# Patient Record
Sex: MALE | Race: BLACK OR AFRICAN AMERICAN | NOT HISPANIC OR LATINO | Employment: FULL TIME | URBAN - METROPOLITAN AREA
[De-identification: names, ages, dates, MRNs, and addresses within clinical notes are randomized per-mention and may not be internally consistent; named-entity substitution may affect disease eponyms.]

---

## 2018-07-18 ENCOUNTER — TELEPHONE (OUTPATIENT)
Dept: SURGERY | Facility: CLINIC | Age: 58
End: 2018-07-18

## 2018-07-27 ENCOUNTER — TELEPHONE (OUTPATIENT)
Dept: SURGERY | Facility: CLINIC | Age: 58
End: 2018-07-27

## 2018-08-17 RX ORDER — ASPIRIN 81 MG/1
1 TABLET ORAL
COMMUNITY
Start: 2017-03-06

## 2018-08-17 RX ORDER — ATORVASTATIN CALCIUM 40 MG/1
1 TABLET, FILM COATED ORAL
COMMUNITY
Start: 2018-04-02 | End: 2018-08-17 | Stop reason: SDUPTHER

## 2018-08-17 RX ORDER — GABAPENTIN 300 MG/1
CAPSULE ORAL
COMMUNITY
Start: 2018-06-01

## 2018-08-17 RX ORDER — AMLODIPINE BESYLATE 5 MG/1
1 TABLET ORAL
COMMUNITY
Start: 2018-04-02

## 2018-08-17 RX ORDER — AMITRIPTYLINE HYDROCHLORIDE 25 MG/1
1 TABLET, FILM COATED ORAL
COMMUNITY
Start: 2017-12-20

## 2018-08-17 RX ORDER — METFORMIN HYDROCHLORIDE 500 MG/1
2 TABLET, EXTENDED RELEASE ORAL
COMMUNITY
Start: 2018-04-09

## 2018-08-23 ENCOUNTER — OFFICE VISIT (OUTPATIENT)
Dept: SURGERY | Facility: CLINIC | Age: 58
End: 2018-08-23
Payer: COMMERCIAL

## 2018-08-23 VITALS
DIASTOLIC BLOOD PRESSURE: 83 MMHG | TEMPERATURE: 97 F | WEIGHT: 183 LBS | OXYGEN SATURATION: 97 % | RESPIRATION RATE: 18 BRPM | BODY MASS INDEX: 29.41 KG/M2 | HEIGHT: 66 IN | SYSTOLIC BLOOD PRESSURE: 143 MMHG | HEART RATE: 87 BPM

## 2018-08-23 DIAGNOSIS — Z12.11 ENCOUNTER FOR SCREENING COLONOSCOPY: Primary | ICD-10-CM

## 2018-08-23 DIAGNOSIS — Z80.0 FAMILY HISTORY OF COLON CANCER: ICD-10-CM

## 2018-08-23 PROBLEM — Z79.4 TYPE 2 DIABETES MELLITUS WITH NEUROLOGIC COMPLICATION, WITH LONG-TERM CURRENT USE OF INSULIN: Status: ACTIVE | Noted: 2018-08-23

## 2018-08-23 PROBLEM — I10 ESSENTIAL HYPERTENSION: Status: ACTIVE | Noted: 2018-08-23

## 2018-08-23 PROBLEM — E78.00 HYPERCHOLESTEROLEMIA: Status: ACTIVE | Noted: 2018-08-23

## 2018-08-23 PROBLEM — E11.49 TYPE 2 DIABETES MELLITUS WITH NEUROLOGIC COMPLICATION, WITH LONG-TERM CURRENT USE OF INSULIN: Status: ACTIVE | Noted: 2018-08-23

## 2018-08-23 PROCEDURE — 3008F BODY MASS INDEX DOCD: CPT | Mod: S$GLB,,, | Performed by: SURGERY

## 2018-08-23 PROCEDURE — 99203 OFFICE O/P NEW LOW 30 MIN: CPT | Mod: S$GLB,,, | Performed by: SURGERY

## 2018-08-23 RX ORDER — LIDOCAINE HYDROCHLORIDE 10 MG/ML
1 INJECTION, SOLUTION EPIDURAL; INFILTRATION; INTRACAUDAL; PERINEURAL ONCE
Status: CANCELLED | OUTPATIENT
Start: 2018-08-23 | End: 2018-08-23

## 2018-08-23 RX ORDER — SODIUM, POTASSIUM,MAG SULFATES 17.5-3.13G
SOLUTION, RECONSTITUTED, ORAL ORAL
Qty: 2 BOTTLE | Refills: 0 | Status: ON HOLD | OUTPATIENT
Start: 2018-08-23 | End: 2018-09-07 | Stop reason: HOSPADM

## 2018-08-23 RX ORDER — SODIUM CHLORIDE, SODIUM LACTATE, POTASSIUM CHLORIDE, CALCIUM CHLORIDE 600; 310; 30; 20 MG/100ML; MG/100ML; MG/100ML; MG/100ML
INJECTION, SOLUTION INTRAVENOUS CONTINUOUS
Status: CANCELLED | OUTPATIENT
Start: 2018-08-23

## 2018-08-23 NOTE — H&P (VIEW-ONLY)
"[facility]  General Surgery  H&P      Patient Name: César Medina  MRN: [id number]     Chief Complaint: "I'm having a scope thing done"    HPI:   Mr. Medina is seen today in consultation from Adrianne MIRZA for colon cancer screening. He has no complaints. No abdominal pain, nausea, vomiting, diarrhea, constipation, melena, hematochezia, change in bowel habits, change in stool characteristics, weight loss, decrease in caliber of stool, etc. His younger brother was diagnosed with colon cancer a couple of years ago. No aggravating or alleviating factors. No other associated symptoms. No prior colonoscopy.     Allergies & Meds:   Review of patient's allergies indicates:   No Known Allergies   Current Medications          Current Outpatient Medications   Medication Sig Dispense Refill    amitriptyline (ELAVIL) 25 MG tablet Take 1 tablet by mouth.      amLODIPine (NORVASC) 5 MG tablet Take 1 tablet by mouth.      aspirin (ECOTRIN) 81 MG EC tablet Take 1 tablet by mouth.      atorvastatin (LIPITOR) 40 MG tablet Take 40 mg by mouth once daily.      gabapentin (NEURONTIN) 300 MG capsule take 1 capsule by oral route at bedtime - will cause drowsiness- use caution      insulin aspart protamine-insulin aspart (NOVOLOG 70/30) 100 unit/mL (70-30) InPn pen Inject into the skin 3 (three) times daily before meals.      insulin detemir (LEVEMIR) 100 unit/mL injection Inject 25 Units into the skin 2 (two) times daily before meals.      metFORMIN (GLUCOPHAGE XR) 500 MG 24 hr tablet Take 2 tablets by mouth.     No current facility-administered medications for this visit.       PMFSHx:        Past Medical History:   Diagnosis Date    Breast lump     Diabetes mellitus     Diabetes mellitus, type 2     Hypertension     Impotence of organic origin     Low testosterone level in male            Past Surgical History:   Procedure Laterality Date    FINGER SURGERY      Deep laceration of " finger           Family History   Problem Relation Age of Onset    Diabetes Mother     Hypertension Mother     Cancer Father     Diabetes Father     Diabetes Sister     Hyperlipidemia Sister     Cancer Brother     Diabetes Brother     Early death Brother     Heart disease Brother     Hyperlipidemia Brother     Diabetes Maternal Aunt     Diabetes Maternal Uncle     Diabetes Paternal Aunt     Diabetes Paternal Uncle     Diabetes Maternal Grandmother     Heart disease Maternal Grandfather     Early death Brother     Heart disease Brother      Social History           Tobacco Use    Smoking status: Never Smoker    Smokeless tobacco: Former User     Types: Snuff   Substance Use Topics    Alcohol use: No    Drug use: No     Review of Systems   Constitutional: Negative for appetite change, chills, fatigue, fever and unexpected weight change.   HENT: Negative for congestion, dental problem, ear pain, mouth sores, postnasal drip, rhinorrhea, sore throat, tinnitus, trouble swallowing and voice change.   Eyes: Negative for photophobia, pain, discharge and visual disturbance.   Respiratory: Negative for cough, chest tightness, shortness of breath and wheezing.   Cardiovascular: Negative for chest pain, palpitations and leg swelling.   Gastrointestinal: Negative for abdominal pain, blood in stool, constipation, diarrhea, nausea and vomiting.   Endocrine: Negative for cold intolerance, heat intolerance, polydipsia, polyphagia and polyuria.   Genitourinary: Negative for difficulty urinating, dysuria, flank pain, frequency, hematuria and urgency.   Musculoskeletal: Negative for arthralgias, joint swelling and myalgias.   Skin: Negative for color change and rash.   Allergic/Immunologic: Negative for immunocompromised state.   Neurological: Negative for dizziness, tremors, seizures, syncope, speech difficulty, weakness, numbness and headaches.   Hematological: Negative for adenopathy. Does not bruise/bleed  easily.   Psychiatric/Behavioral: Negative for agitation, confusion, hallucinations, self-injury and suicidal ideas. The patient is not nervous/anxious.     Objective:     Physical Exam   Constitutional: He is oriented to person, place, and time. He appears well-developed and well-nourished. He is active. Non-toxic appearance. He does not appear ill. No distress.   HENT:   Head: Normocephalic and atraumatic. Head is without contusion.   Right Ear: Hearing and external ear normal. No drainage or tenderness.   Left Ear: Hearing and external ear normal. No drainage or tenderness.   Nose: Nose normal. No rhinorrhea.   Eyes: Conjunctivae and lids are normal. Pupils are equal, round, and reactive to light. Right eye exhibits no discharge and no exudate. Left eye exhibits no discharge and no exudate. Right conjunctiva is not injected. Left conjunctiva is not injected.   Neck: Trachea normal, normal range of motion and phonation normal. No JVD present. Carotid bruit is not present. No tracheal deviation and no edema present. No thyroid mass and no thyromegaly present.   Cardiovascular: Normal rate, regular rhythm, S1 normal, S2 normal, normal heart sounds and intact distal pulses. PMI is not displaced. Exam reveals no gallop and no friction rub.   No murmur heard.   Pulmonary/Chest: Effort normal and breath sounds normal. No accessory muscle usage. No tachypnea. No respiratory distress. He exhibits no mass, no tenderness, no crepitus and no retraction. Right breast exhibits no inverted nipple, no mass, no nipple discharge and no skin change. Left breast exhibits no inverted nipple, no mass, no nipple discharge and no skin change. Breasts are symmetrical.   Abdominal: Soft. Normal appearance and bowel sounds are normal. He exhibits no distension, no fluid wave, no abdominal bruit and no mass. There is no hepatosplenomegaly. There is no tenderness. No hernia. Hernia confirmed negative in the ventral area, confirmed negative  in the right inguinal area and confirmed negative in the left inguinal area.   Genitourinary: Testes normal and penis normal. Right testis shows no mass and no swelling. Left testis shows no mass and no swelling.   Musculoskeletal:   Cervical back: Normal.   Thoracic back: Normal.   Lumbar back: Normal.   Right upper arm: Normal.   Left upper arm: Normal.   Right forearm: Normal.   Left forearm: Normal.   Right hand: Normal.   Left hand: Normal.   Right upper leg: Normal.   Left upper leg: Normal.   Right lower leg: Normal.   Left lower leg: Normal.   Right foot: Normal.   Left foot: Normal.   Lymphadenopathy:   Head (right side): No submental and no submandibular adenopathy present.   Head (left side): No submental and no submandibular adenopathy present.   He has no cervical adenopathy.   He has no axillary adenopathy. No inguinal adenopathy noted on the right or left side.   Right: No inguinal and no supraclavicular adenopathy present.   Left: No inguinal and no supraclavicular adenopathy present.   Neurological: He is alert and oriented to person, place, and time. He has normal strength. He is not disoriented. He displays no atrophy and no tremor. No cranial nerve deficit or sensory deficit. Coordination and gait normal. GCS eye subscore is 4. GCS verbal subscore is 5. GCS motor subscore is 6.   Skin: Skin is warm, dry and intact. No lesion and no rash noted. No cyanosis. Nails show no clubbing.   Psychiatric: He has a normal mood and affect. His speech is normal and behavior is normal. Thought content normal. He is not actively hallucinating. He is attentive.     Test Results: Pending   Assessment:     1. Encounter for screening colonoscopy    2. Family history of colon cancer      Plan:   Encounter for screening colonoscopy   - Case Request Operating Room: COLONOSCOPY   - Place in Outpatient; Standing   - Vital signs; Standing   - Insert peripheral IV; Standing   - Verify beta-blocker dose taken within 24  hours if patient is prescribed beta-blocker; Standing   - Height and weight; Standing   - Verify discontinuation of antithrombotics; Standing   - Verify blood consent; Standing   - Verify consent; Standing   - Diet NPO; Standing   - Comprehensive metabolic panel; Future; Expected date: 08/23/2018   - CBC auto differential; Future; Expected date: 08/23/2018   - Pulse Oximetry Continuous; Standing   - EKG 12-lead; Future   Family history of colon cancer   Other orders   - lidocaine (PF) 10 mg/ml (1%) injection 10 mg; Inject 1 mL (10 mg total) into the skin once.   - lactated ringers infusion; Inject into the vein continuous.   - IP VTE LOW RISK PATIENT; Standing     Follow-up in about 3 weeks (around 9/13/2018) for routine post-endosocpy visit.   Counseling/Medical Decision Making: César Whiteheadum was counseled regarding the results of the evaluation thus far and the differential diagnosis. Diagnostic and therapeutic options were discussed in detail along with the risks, benefits, possible complications, details of, and indications for each option. Diagnoses discussed included but were not limited to: hemorrhoids, diverticulosis, cancer, IBD, IBS, benign tumors, angiodysplasias. Options discussed included but were not limited to: colonoscopy, proctoscopy, anoscopy, sigmoidoscopy, radiologic studies, PillCam, empiric therapy, second opinion, etc. Possible complications of colonoscopy discussed included but were not limited to: bleeding, infections, endocarditis, injury to internal organs, incomplete exam, failure to diagnose cancer or other serious condition, need for emergency surgery, need for a temporary colostomy, need for additional operations or procedures, etc. Entire conversation was held in layman's terms. Questions solicited and answered. I read the consent form to him verbatim. All unfamiliar terms were defined. [facility] booklets were provided on colonoscopy, polyps, diverticulosis, hemorrhoids, cancer, etc. A  copy of the consent form was provided as well for his review outside the office / hospital prior to the procedure. At the conclusion of the conversation he voiced complete understanding of all we discussed and satisfaction that all of his questions had been answered to his full understanding. He stated that he felt fully informed and totally capable of making an informed decision. He desires and requests to proceed with colonoscopy.

## 2018-08-23 NOTE — PROGRESS NOTES
Subjective:       Patient ID: César Medina is a 57 y.o. male.    Chief Complaint: Consult ([id number])      HPI:  Mr. Medina is seen today in consultation from Adrianne MIRZA for colon cancer screening. He has no complaints. No abdominal pain, nausea, vomiting, diarrhea, constipation, melena, hematochezia, change in bowel habits, change in stool characteristics, weight loss, decrease in caliber of stool, etc.  His younger brother was diagnosed with colon cancer a couple of years ago.  No aggravating or alleviating factors. No other associated symptoms. No prior colonoscopy.        Allergies & Meds:  Review of patient's allergies indicates:  No Known Allergies    Current Outpatient Medications   Medication Sig Dispense Refill    amitriptyline (ELAVIL) 25 MG tablet Take 1 tablet by mouth.      amLODIPine (NORVASC) 5 MG tablet Take 1 tablet by mouth.      aspirin (ECOTRIN) 81 MG EC tablet Take 1 tablet by mouth.      atorvastatin (LIPITOR) 40 MG tablet Take 40 mg by mouth once daily.      gabapentin (NEURONTIN) 300 MG capsule take 1 capsule by oral route at bedtime - will cause drowsiness- use caution      insulin aspart protamine-insulin aspart (NOVOLOG 70/30) 100 unit/mL (70-30) InPn pen Inject into the skin 3 (three) times daily before meals.      insulin detemir (LEVEMIR) 100 unit/mL injection Inject 25 Units into the skin 2 (two) times daily before meals.      metFORMIN (GLUCOPHAGE XR) 500 MG 24 hr tablet Take 2 tablets by mouth.       No current facility-administered medications for this visit.        PMFSHx:  Past Medical History:   Diagnosis Date    Breast lump     Diabetes mellitus     Diabetes mellitus, type 2     Hypertension     Impotence of organic origin     Low testosterone level in male        Past Surgical History:   Procedure Laterality Date    FINGER SURGERY      Deep laceration of finger       Family History   Problem Relation Age of Onset    Diabetes Mother      Hypertension Mother     Cancer Father     Diabetes Father     Diabetes Sister     Hyperlipidemia Sister     Cancer Brother     Diabetes Brother     Early death Brother     Heart disease Brother     Hyperlipidemia Brother     Diabetes Maternal Aunt     Diabetes Maternal Uncle     Diabetes Paternal Aunt     Diabetes Paternal Uncle     Diabetes Maternal Grandmother     Heart disease Maternal Grandfather     Early death Brother     Heart disease Brother        Social History     Tobacco Use    Smoking status: Never Smoker    Smokeless tobacco: Former User     Types: Snuff   Substance Use Topics    Alcohol use: No    Drug use: No       Review of Systems   Constitutional: Negative for appetite change, chills, fatigue, fever and unexpected weight change.   HENT: Negative for congestion, dental problem, ear pain, mouth sores, postnasal drip, rhinorrhea, sore throat, tinnitus, trouble swallowing and voice change.    Eyes: Negative for photophobia, pain, discharge and visual disturbance.   Respiratory: Negative for cough, chest tightness, shortness of breath and wheezing.    Cardiovascular: Negative for chest pain, palpitations and leg swelling.   Gastrointestinal: Negative for abdominal pain, blood in stool, constipation, diarrhea, nausea and vomiting.   Endocrine: Negative for cold intolerance, heat intolerance, polydipsia, polyphagia and polyuria.   Genitourinary: Negative for difficulty urinating, dysuria, flank pain, frequency, hematuria and urgency.   Musculoskeletal: Negative for arthralgias, joint swelling and myalgias.   Skin: Negative for color change and rash.   Allergic/Immunologic: Negative for immunocompromised state.   Neurological: Negative for dizziness, tremors, seizures, syncope, speech difficulty, weakness, numbness and headaches.   Hematological: Negative for adenopathy. Does not bruise/bleed easily.   Psychiatric/Behavioral: Negative for agitation, confusion, hallucinations,  self-injury and suicidal ideas. The patient is not nervous/anxious.        Objective:      Physical Exam   Constitutional: He is oriented to person, place, and time. He appears well-developed and well-nourished. He is active.  Non-toxic appearance. He does not appear ill. No distress.   HENT:   Head: Normocephalic and atraumatic. Head is without contusion.   Right Ear: Hearing and external ear normal. No drainage or tenderness.   Left Ear: Hearing and external ear normal. No drainage or tenderness.   Nose: Nose normal. No rhinorrhea.   Eyes: Conjunctivae and lids are normal. Pupils are equal, round, and reactive to light. Right eye exhibits no discharge and no exudate. Left eye exhibits no discharge and no exudate. Right conjunctiva is not injected. Left conjunctiva is not injected.   Neck: Trachea normal, normal range of motion and phonation normal. No JVD present. Carotid bruit is not present. No tracheal deviation and no edema present. No thyroid mass and no thyromegaly present.   Cardiovascular: Normal rate, regular rhythm, S1 normal, S2 normal, normal heart sounds and intact distal pulses. PMI is not displaced. Exam reveals no gallop and no friction rub.   No murmur heard.  Pulmonary/Chest: Effort normal and breath sounds normal. No accessory muscle usage. No tachypnea. No respiratory distress. He exhibits no mass, no tenderness, no crepitus and no retraction. Right breast exhibits no inverted nipple, no mass, no nipple discharge and no skin change. Left breast exhibits no inverted nipple, no mass, no nipple discharge and no skin change. Breasts are symmetrical.   Abdominal: Soft. Normal appearance and bowel sounds are normal. He exhibits no distension, no fluid wave, no abdominal bruit and no mass. There is no hepatosplenomegaly. There is no tenderness. No hernia. Hernia confirmed negative in the ventral area, confirmed negative in the right inguinal area and confirmed negative in the left inguinal area.    Genitourinary: Testes normal and penis normal. Right testis shows no mass and no swelling. Left testis shows no mass and no swelling.   Musculoskeletal:        Cervical back: Normal.        Thoracic back: Normal.        Lumbar back: Normal.        Right upper arm: Normal.        Left upper arm: Normal.        Right forearm: Normal.        Left forearm: Normal.        Right hand: Normal.        Left hand: Normal.        Right upper leg: Normal.        Left upper leg: Normal.        Right lower leg: Normal.        Left lower leg: Normal.        Right foot: Normal.        Left foot: Normal.   Lymphadenopathy:        Head (right side): No submental and no submandibular adenopathy present.        Head (left side): No submental and no submandibular adenopathy present.     He has no cervical adenopathy.     He has no axillary adenopathy. No inguinal adenopathy noted on the right or left side.        Right: No inguinal and no supraclavicular adenopathy present.        Left: No inguinal and no supraclavicular adenopathy present.   Neurological: He is alert and oriented to person, place, and time. He has normal strength. He is not disoriented. He displays no atrophy and no tremor. No cranial nerve deficit or sensory deficit. Coordination and gait normal. GCS eye subscore is 4. GCS verbal subscore is 5. GCS motor subscore is 6.   Skin: Skin is warm, dry and intact. No lesion and no rash noted. No cyanosis. Nails show no clubbing.   Psychiatric: He has a normal mood and affect. His speech is normal and behavior is normal. Thought content normal. He is not actively hallucinating. He is attentive.         Test Results:  Pending    Assessment:       1. Encounter for screening colonoscopy    2. Family history of colon cancer        Plan:   Encounter for screening colonoscopy  -     Case Request Operating Room: COLONOSCOPY  -     Place in Outpatient; Standing  -     Vital signs; Standing  -     Insert peripheral IV; Standing  -      Verify beta-blocker dose taken within 24 hours if patient is prescribed beta-blocker; Standing  -     Height and weight; Standing  -     Verify discontinuation of antithrombotics; Standing  -     Verify blood consent; Standing  -     Verify consent; Standing  -     Diet NPO; Standing  -     Comprehensive metabolic panel; Future; Expected date: 08/23/2018  -     CBC auto differential; Future; Expected date: 08/23/2018  -     Pulse Oximetry Continuous; Standing  -     EKG 12-lead; Future    Family history of colon cancer    Other orders  -     lidocaine (PF) 10 mg/ml (1%) injection 10 mg; Inject 1 mL (10 mg total) into the skin once.  -     lactated ringers infusion; Inject into the vein continuous.  -     IP VTE LOW RISK PATIENT; Standing        Follow-up in about 3 weeks (around 9/13/2018) for routine post-endosocpy visit.    Counseling/Medical Decision Making:  César Medina was counseled regarding the results of the evaluation thus far and the differential diagnosis.  Diagnostic and therapeutic options were discussed in detail along with the risks, benefits, possible complications, details of, and indications for each option.  Diagnoses discussed included but were not limited to: hemorrhoids, diverticulosis, cancer, IBD, IBS, benign tumors, angiodysplasias.  Options discussed included but were not limited to: colonoscopy, proctoscopy, anoscopy, sigmoidoscopy, radiologic studies, PillCam, empiric therapy, second opinion, etc. Possible complications of colonoscopy discussed included but were not limited to: bleeding, infections, endocarditis, injury to internal organs, incomplete exam, failure to diagnose cancer or other serious condition, need for emergency surgery, need for a temporary colostomy, need for additional operations or procedures, etc.  Entire conversation was held in layman's terms.  Questions solicited and answered.  I read the consent form to him verbatim.  All unfamiliar terms were defined.  [facility]  booklets were provided on colonoscopy, polyps, diverticulosis, hemorrhoids, cancer, etc.  A copy of the consent form was provided as well for his review outside the office / hospital prior to the procedure.  At the conclusion of the conversation he voiced complete understanding of all we discussed and satisfaction that all of his questions had been answered to his full understanding.  He stated that he felt fully informed and totally capable of making an informed decision.  He desires and requests to proceed with colonoscopy.    Total face-to-face encounter time was 30 minutes with 15 minutes spent counseling the patient as outlined/summarized above.

## 2018-08-23 NOTE — H&P
"[facility] - General Surgery  General Surgery  H&P      Patient Name: César Medina  MRN: [id number]     Chief Complaint: "I'm having a scope thing done"    HPI:   Mr. Medina is seen today in consultation from Adrianne MIRZA for colon cancer screening. He has no complaints. No abdominal pain, nausea, vomiting, diarrhea, constipation, melena, hematochezia, change in bowel habits, change in stool characteristics, weight loss, decrease in caliber of stool, etc. His younger brother was diagnosed with colon cancer a couple of years ago. No aggravating or alleviating factors. No other associated symptoms. No prior colonoscopy.     Allergies & Meds:   Review of patient's allergies indicates:   No Known Allergies   Current Medications          Current Outpatient Medications   Medication Sig Dispense Refill    amitriptyline (ELAVIL) 25 MG tablet Take 1 tablet by mouth.      amLODIPine (NORVASC) 5 MG tablet Take 1 tablet by mouth.      aspirin (ECOTRIN) 81 MG EC tablet Take 1 tablet by mouth.      atorvastatin (LIPITOR) 40 MG tablet Take 40 mg by mouth once daily.      gabapentin (NEURONTIN) 300 MG capsule take 1 capsule by oral route at bedtime - will cause drowsiness- use caution      insulin aspart protamine-insulin aspart (NOVOLOG 70/30) 100 unit/mL (70-30) InPn pen Inject into the skin 3 (three) times daily before meals.      insulin detemir (LEVEMIR) 100 unit/mL injection Inject 25 Units into the skin 2 (two) times daily before meals.      metFORMIN (GLUCOPHAGE XR) 500 MG 24 hr tablet Take 2 tablets by mouth.     No current facility-administered medications for this visit.       PMFSHx:        Past Medical History:   Diagnosis Date    Breast lump     Diabetes mellitus     Diabetes mellitus, type 2     Hypertension     Impotence of organic origin     Low testosterone level in male            Past Surgical History:   Procedure Laterality Date    FINGER SURGERY      Deep laceration of " finger           Family History   Problem Relation Age of Onset    Diabetes Mother     Hypertension Mother     Cancer Father     Diabetes Father     Diabetes Sister     Hyperlipidemia Sister     Cancer Brother     Diabetes Brother     Early death Brother     Heart disease Brother     Hyperlipidemia Brother     Diabetes Maternal Aunt     Diabetes Maternal Uncle     Diabetes Paternal Aunt     Diabetes Paternal Uncle     Diabetes Maternal Grandmother     Heart disease Maternal Grandfather     Early death Brother     Heart disease Brother      Social History           Tobacco Use    Smoking status: Never Smoker    Smokeless tobacco: Former User     Types: Snuff   Substance Use Topics    Alcohol use: No    Drug use: No     Review of Systems   Constitutional: Negative for appetite change, chills, fatigue, fever and unexpected weight change.   HENT: Negative for congestion, dental problem, ear pain, mouth sores, postnasal drip, rhinorrhea, sore throat, tinnitus, trouble swallowing and voice change.   Eyes: Negative for photophobia, pain, discharge and visual disturbance.   Respiratory: Negative for cough, chest tightness, shortness of breath and wheezing.   Cardiovascular: Negative for chest pain, palpitations and leg swelling.   Gastrointestinal: Negative for abdominal pain, blood in stool, constipation, diarrhea, nausea and vomiting.   Endocrine: Negative for cold intolerance, heat intolerance, polydipsia, polyphagia and polyuria.   Genitourinary: Negative for difficulty urinating, dysuria, flank pain, frequency, hematuria and urgency.   Musculoskeletal: Negative for arthralgias, joint swelling and myalgias.   Skin: Negative for color change and rash.   Allergic/Immunologic: Negative for immunocompromised state.   Neurological: Negative for dizziness, tremors, seizures, syncope, speech difficulty, weakness, numbness and headaches.   Hematological: Negative for adenopathy. Does not bruise/bleed  easily.   Psychiatric/Behavioral: Negative for agitation, confusion, hallucinations, self-injury and suicidal ideas. The patient is not nervous/anxious.     Objective:     Physical Exam   Constitutional: He is oriented to person, place, and time. He appears well-developed and well-nourished. He is active. Non-toxic appearance. He does not appear ill. No distress.   HENT:   Head: Normocephalic and atraumatic. Head is without contusion.   Right Ear: Hearing and external ear normal. No drainage or tenderness.   Left Ear: Hearing and external ear normal. No drainage or tenderness.   Nose: Nose normal. No rhinorrhea.   Eyes: Conjunctivae and lids are normal. Pupils are equal, round, and reactive to light. Right eye exhibits no discharge and no exudate. Left eye exhibits no discharge and no exudate. Right conjunctiva is not injected. Left conjunctiva is not injected.   Neck: Trachea normal, normal range of motion and phonation normal. No JVD present. Carotid bruit is not present. No tracheal deviation and no edema present. No thyroid mass and no thyromegaly present.   Cardiovascular: Normal rate, regular rhythm, S1 normal, S2 normal, normal heart sounds and intact distal pulses. PMI is not displaced. Exam reveals no gallop and no friction rub.   No murmur heard.   Pulmonary/Chest: Effort normal and breath sounds normal. No accessory muscle usage. No tachypnea. No respiratory distress. He exhibits no mass, no tenderness, no crepitus and no retraction. Right breast exhibits no inverted nipple, no mass, no nipple discharge and no skin change. Left breast exhibits no inverted nipple, no mass, no nipple discharge and no skin change. Breasts are symmetrical.   Abdominal: Soft. Normal appearance and bowel sounds are normal. He exhibits no distension, no fluid wave, no abdominal bruit and no mass. There is no hepatosplenomegaly. There is no tenderness. No hernia. Hernia confirmed negative in the ventral area, confirmed negative  in the right inguinal area and confirmed negative in the left inguinal area.   Genitourinary: Testes normal and penis normal. Right testis shows no mass and no swelling. Left testis shows no mass and no swelling.   Musculoskeletal:   Cervical back: Normal.   Thoracic back: Normal.   Lumbar back: Normal.   Right upper arm: Normal.   Left upper arm: Normal.   Right forearm: Normal.   Left forearm: Normal.   Right hand: Normal.   Left hand: Normal.   Right upper leg: Normal.   Left upper leg: Normal.   Right lower leg: Normal.   Left lower leg: Normal.   Right foot: Normal.   Left foot: Normal.   Lymphadenopathy:   Head (right side): No submental and no submandibular adenopathy present.   Head (left side): No submental and no submandibular adenopathy present.   He has no cervical adenopathy.   He has no axillary adenopathy. No inguinal adenopathy noted on the right or left side.   Right: No inguinal and no supraclavicular adenopathy present.   Left: No inguinal and no supraclavicular adenopathy present.   Neurological: He is alert and oriented to person, place, and time. He has normal strength. He is not disoriented. He displays no atrophy and no tremor. No cranial nerve deficit or sensory deficit. Coordination and gait normal. GCS eye subscore is 4. GCS verbal subscore is 5. GCS motor subscore is 6.   Skin: Skin is warm, dry and intact. No lesion and no rash noted. No cyanosis. Nails show no clubbing.   Psychiatric: He has a normal mood and affect. His speech is normal and behavior is normal. Thought content normal. He is not actively hallucinating. He is attentive.     Test Results: Pending   Assessment:     1. Encounter for screening colonoscopy    2. Family history of colon cancer      Plan:   Encounter for screening colonoscopy   - Case Request Operating Room: COLONOSCOPY   - Place in Outpatient; Standing   - Vital signs; Standing   - Insert peripheral IV; Standing   - Verify beta-blocker dose taken within 24  hours if patient is prescribed beta-blocker; Standing   - Height and weight; Standing   - Verify discontinuation of antithrombotics; Standing   - Verify blood consent; Standing   - Verify consent; Standing   - Diet NPO; Standing   - Comprehensive metabolic panel; Future; Expected date: 08/23/2018   - CBC auto differential; Future; Expected date: 08/23/2018   - Pulse Oximetry Continuous; Standing   - EKG 12-lead; Future   Family history of colon cancer   Other orders   - lidocaine (PF) 10 mg/ml (1%) injection 10 mg; Inject 1 mL (10 mg total) into the skin once.   - lactated ringers infusion; Inject into the vein continuous.   - IP VTE LOW RISK PATIENT; Standing     Follow-up in about 3 weeks (around 9/13/2018) for routine post-endosocpy visit.   Counseling/Medical Decision Making: César Whiteheadum was counseled regarding the results of the evaluation thus far and the differential diagnosis. Diagnostic and therapeutic options were discussed in detail along with the risks, benefits, possible complications, details of, and indications for each option. Diagnoses discussed included but were not limited to: hemorrhoids, diverticulosis, cancer, IBD, IBS, benign tumors, angiodysplasias. Options discussed included but were not limited to: colonoscopy, proctoscopy, anoscopy, sigmoidoscopy, radiologic studies, PillCam, empiric therapy, second opinion, etc. Possible complications of colonoscopy discussed included but were not limited to: bleeding, infections, endocarditis, injury to internal organs, incomplete exam, failure to diagnose cancer or other serious condition, need for emergency surgery, need for a temporary colostomy, need for additional operations or procedures, etc. Entire conversation was held in layman's terms. Questions solicited and answered. I read the consent form to him verbatim. All unfamiliar terms were defined. Krames booklets were provided on colonoscopy, polyps, diverticulosis, hemorrhoids, cancer, etc. A  copy of the consent form was provided as well for his review outside the office / hospital prior to the procedure. At the conclusion of the conversation he voiced complete understanding of all we discussed and satisfaction that all of his questions had been answered to his full understanding. He stated that he felt fully informed and totally capable of making an informed decision. He desires and requests to proceed with colonoscopy.

## 2018-08-27 ENCOUNTER — TELEPHONE (OUTPATIENT)
Dept: SURGERY | Facility: CLINIC | Age: 58
End: 2018-08-27

## 2018-08-27 NOTE — TELEPHONE ENCOUNTER
Phoned pt's wife, Leila.  Advised her that I would call a different bowel prep in to [facility] with new instructions.  Instructed her that pt could have a light breakfast on the day before his procedure before 9:00 am but would have to be on a clear liquid diet the rest of the day.  Also instructed her that pt would have to drink the entire contents of the bowel prep between 2-4 pm and may return back to clear liquids until midnight.  She verbalizes understanding of instructions.  Phoned [facility] with new prescription.

## 2018-08-27 NOTE — TELEPHONE ENCOUNTER
----- Message from Hanna Castro sent at 8/27/2018 10:54 AM CDT -----  Contact: Wife Leila   Wife Leila called, she want to speak with a nurse regarding patient prep medication being too expensive and she need something cheaper. Please call back at [phone]

## 2018-09-07 ENCOUNTER — HOSPITAL ENCOUNTER (OUTPATIENT)
Facility: HOSPITAL | Age: 58
Discharge: HOME | End: 2018-09-07
Attending: SURGERY | Admitting: SURGERY
Payer: COMMERCIAL

## 2018-09-07 ENCOUNTER — ANESTHESIA EVENT (OUTPATIENT)
Dept: SURGERY | Facility: HOSPITAL | Age: 58
End: 2018-09-07
Payer: COMMERCIAL

## 2018-09-07 ENCOUNTER — ANESTHESIA (OUTPATIENT)
Dept: SURGERY | Facility: HOSPITAL | Age: 58
End: 2018-09-07
Payer: COMMERCIAL

## 2018-09-07 ENCOUNTER — HOSPITAL ENCOUNTER (OUTPATIENT)
Dept: CARDIOLOGY | Facility: HOSPITAL | Age: 58
Discharge: HOME | End: 2018-09-07
Attending: SURGERY
Payer: COMMERCIAL

## 2018-09-07 VITALS
BODY MASS INDEX: 28.93 KG/M2 | HEART RATE: 71 BPM | HEIGHT: 66 IN | SYSTOLIC BLOOD PRESSURE: 108 MMHG | DIASTOLIC BLOOD PRESSURE: 83 MMHG | RESPIRATION RATE: 13 BRPM | TEMPERATURE: 97 F | WEIGHT: 180 LBS | OXYGEN SATURATION: 97 %

## 2018-09-07 DIAGNOSIS — Z12.11 ENCOUNTER FOR SCREENING COLONOSCOPY: ICD-10-CM

## 2018-09-07 LAB — POCT GLUCOSE: 153 MG/DL (ref 70–110)

## 2018-09-07 PROCEDURE — 37000009 HC ANESTHESIA EA ADD 15 MINS: Performed by: SURGERY

## 2018-09-07 PROCEDURE — G0121 COLON CA SCRN NOT HI RSK IND: HCPCS | Mod: ,,, | Performed by: SURGERY

## 2018-09-07 PROCEDURE — 45378 DIAGNOSTIC COLONOSCOPY: CPT | Performed by: SURGERY

## 2018-09-07 PROCEDURE — 25000003 PHARM REV CODE 250: Performed by: SURGERY

## 2018-09-07 PROCEDURE — 63600175 PHARM REV CODE 636 W HCPCS: Performed by: NURSE ANESTHETIST, CERTIFIED REGISTERED

## 2018-09-07 PROCEDURE — D9220A PRA ANESTHESIA: Mod: ,,, | Performed by: ANESTHESIOLOGY

## 2018-09-07 PROCEDURE — 37000008 HC ANESTHESIA 1ST 15 MINUTES: Performed by: SURGERY

## 2018-09-07 PROCEDURE — 93005 ELECTROCARDIOGRAM TRACING: CPT

## 2018-09-07 RX ORDER — FAMOTIDINE 10 MG/ML
20 INJECTION INTRAVENOUS ONCE
Status: DISCONTINUED | OUTPATIENT
Start: 2018-09-07 | End: 2018-09-07 | Stop reason: HOSPADM

## 2018-09-07 RX ORDER — MORPHINE SULFATE 4 MG/ML
2 INJECTION, SOLUTION INTRAMUSCULAR; INTRAVENOUS EVERY 5 MIN PRN
Status: DISCONTINUED | OUTPATIENT
Start: 2018-09-07 | End: 2018-09-07 | Stop reason: HOSPADM

## 2018-09-07 RX ORDER — KETOROLAC TROMETHAMINE 30 MG/ML
15 INJECTION, SOLUTION INTRAMUSCULAR; INTRAVENOUS ONCE
Status: DISCONTINUED | OUTPATIENT
Start: 2018-09-07 | End: 2018-09-07 | Stop reason: HOSPADM

## 2018-09-07 RX ORDER — ONDANSETRON 2 MG/ML
4 INJECTION INTRAMUSCULAR; INTRAVENOUS DAILY PRN
Status: DISCONTINUED | OUTPATIENT
Start: 2018-09-07 | End: 2018-09-07 | Stop reason: HOSPADM

## 2018-09-07 RX ORDER — MIDAZOLAM HYDROCHLORIDE 1 MG/ML
INJECTION, SOLUTION INTRAMUSCULAR; INTRAVENOUS
Status: DISCONTINUED | OUTPATIENT
Start: 2018-09-07 | End: 2018-09-07

## 2018-09-07 RX ORDER — SODIUM CHLORIDE, SODIUM LACTATE, POTASSIUM CHLORIDE, CALCIUM CHLORIDE 600; 310; 30; 20 MG/100ML; MG/100ML; MG/100ML; MG/100ML
125 INJECTION, SOLUTION INTRAVENOUS CONTINUOUS
Status: DISCONTINUED | OUTPATIENT
Start: 2018-09-07 | End: 2018-09-07 | Stop reason: HOSPADM

## 2018-09-07 RX ORDER — LIDOCAINE HYDROCHLORIDE 10 MG/ML
1 INJECTION, SOLUTION EPIDURAL; INFILTRATION; INTRACAUDAL; PERINEURAL ONCE
Status: DISCONTINUED | OUTPATIENT
Start: 2018-09-07 | End: 2018-09-07 | Stop reason: HOSPADM

## 2018-09-07 RX ORDER — PROPOFOL 10 MG/ML
VIAL (ML) INTRAVENOUS
Status: DISCONTINUED | OUTPATIENT
Start: 2018-09-07 | End: 2018-09-07

## 2018-09-07 RX ORDER — SODIUM CHLORIDE, SODIUM LACTATE, POTASSIUM CHLORIDE, CALCIUM CHLORIDE 600; 310; 30; 20 MG/100ML; MG/100ML; MG/100ML; MG/100ML
INJECTION, SOLUTION INTRAVENOUS CONTINUOUS
Status: DISCONTINUED | OUTPATIENT
Start: 2018-09-07 | End: 2018-09-07 | Stop reason: HOSPADM

## 2018-09-07 RX ORDER — DIPHENHYDRAMINE HYDROCHLORIDE 50 MG/ML
12.5 INJECTION INTRAMUSCULAR; INTRAVENOUS
Status: DISCONTINUED | OUTPATIENT
Start: 2018-09-07 | End: 2018-09-07 | Stop reason: HOSPADM

## 2018-09-07 RX ORDER — PROPOFOL 10 MG/ML
VIAL (ML) INTRAVENOUS CONTINUOUS PRN
Status: DISCONTINUED | OUTPATIENT
Start: 2018-09-07 | End: 2018-09-07

## 2018-09-07 RX ORDER — SODIUM CHLORIDE, SODIUM LACTATE, POTASSIUM CHLORIDE, CALCIUM CHLORIDE 600; 310; 30; 20 MG/100ML; MG/100ML; MG/100ML; MG/100ML
INJECTION, SOLUTION INTRAVENOUS CONTINUOUS
Status: CANCELLED | OUTPATIENT
Start: 2018-09-07

## 2018-09-07 RX ORDER — LIDOCAINE HYDROCHLORIDE 10 MG/ML
1 INJECTION, SOLUTION EPIDURAL; INFILTRATION; INTRACAUDAL; PERINEURAL ONCE
Status: CANCELLED | OUTPATIENT
Start: 2018-09-07 | End: 2018-09-07

## 2018-09-07 RX ADMIN — SODIUM CHLORIDE, POTASSIUM CHLORIDE, SODIUM LACTATE AND CALCIUM CHLORIDE: 600; 310; 30; 20 INJECTION, SOLUTION INTRAVENOUS at 08:09

## 2018-09-07 RX ADMIN — PROPOFOL 50 MG: 10 INJECTION, EMULSION INTRAVENOUS at 10:09

## 2018-09-07 RX ADMIN — MIDAZOLAM HYDROCHLORIDE 2 MG: 1 INJECTION, SOLUTION INTRAMUSCULAR; INTRAVENOUS at 10:09

## 2018-09-07 RX ADMIN — PROPOFOL 160 MCG/KG/MIN: 10 INJECTION, EMULSION INTRAVENOUS at 10:09

## 2018-09-07 NOTE — ANESTHESIA POSTPROCEDURE EVALUATION
"Anesthesia Post Evaluation    Patient: César Medina    Procedure(s) Performed: Procedure(s) (LRB):  COLONOSCOPY (N/A)    Final Anesthesia Type: general  Patient location during evaluation: PACU  Patient participation: Yes- Able to Participate  Level of consciousness: awake and awake and alert  Post-procedure vital signs: reviewed and stable  Pain management: adequate  Airway patency: patent  PONV status at discharge: No PONV  Anesthetic complications: no      Cardiovascular status: blood pressure returned to baseline  Respiratory status: unassisted and spontaneous ventilation  Hydration status: euvolemic  Follow-up not needed.        Visit Vitals  /83   Pulse 71   Temp 36.3 °C (97.3 °F)   Resp 13   Ht 5' 6" (1.676 m)   Wt 81.6 kg (180 lb)   SpO2 97%   BMI 29.05 kg/m²       Pain/Arnaldo Score: Pain Assessment Performed: Yes (9/7/2018 10:55 AM)  Presence of Pain: denies (9/7/2018 10:55 AM)  Arnaldo Score: 10 (9/7/2018 11:00 AM)  Modified Arnaldo Score: 20 (9/7/2018 11:00 AM)        "

## 2018-09-07 NOTE — [employer] PATIENT INSTRUCTIONS
Discharge Summary/Instructions after an Endoscopic Procedure  Patient Name: César Medina  Patient MRN: [id number]  Patient YOB: 1960  Friday, September 07, 2018  Costa Strickland MD  RESTRICTIONS:  During your procedure today, you received medications for sedation.  These   medications may affect your judgment, balance and coordination.  Therefore,   for 24 hours, you have the following restrictions:   - DO NOT drive a car, operate machinery, make legal/financial decisions,   sign important papers or drink alcohol.    ACTIVITY:  Today: no heavy lifting, straining or running due to procedural   sedation/anesthesia.  The following day: return to full activity including work.  DIET:  Eat and drink normally unless instructed otherwise.     TREATMENT FOR COMMON SIDE EFFECTS:  - Mild abdominal pain, nausea, belching, bloating or excessive gas:  rest,   eat lightly and use a heating pad.  - Sore Throat: treat with throat lozenges and/or gargle with warm salt   water.  - Because air was used during the procedure, expelling large amounts of air   from your rectum or belching is normal.  - If a bowel prep was taken, you may not have a bowel movement for 1-3 days.    This is normal.  SYMPTOMS TO WATCH FOR AND REPORT TO YOUR PHYSICIAN:  1. Abdominal pain or bloating, other than gas cramps.  2. Chest pain.  3. Back pain.  4. Signs of infection such as: chills or fever occurring within 24 hours   after the procedure.  5. Rectal bleeding, which would show as bright red, maroon, or black stools.   (A tablespoon of blood from the rectum is not serious, especially if   hemorrhoids are present.)  6. Vomiting.  7. Weakness or dizziness.  GO DIRECTLY TO THE NEAREST EMERGENCY ROOM IF YOU HAVE ANY OF THE FOLLOWING:      Difficulty breathing              Chills and/or fever over 101 F   Persistent vomiting and/or vomiting blood   Severe abdominal pain   Severe chest pain   Black, tarry stools   Bleeding- more than one  tablespoon   Any other symptom or condition that you feel may need urgent attention  Your doctor recommends these additional instructions:  If any biopsies were taken, your doctors clinic will contact you in 1 to 2   weeks with any results.  - Discharge patient to home.   - Diabetic (ADA) diet.   - Continue present medications.   - Repeat colonoscopy in 10 years for screening purposes.   - Return to primary care physician as previously scheduled.  For questions, problems or results please call your physician - Costa Strickland MD at Work:  [phone].  [facility] EMERGENCY ROOM PHONE NUMBER: [phone]  IF A COMPLICATION OR EMERGENCY SITUATION ARISES AND YOU ARE UNABLE TO REACH   YOUR PHYSICIAN - GO DIRECTLY TO THE EMERGENCY ROOM.  MD Costa Betancourt MD  9/7/2018 10:46:49 AM  This report has been verified and signed electronically.  [employer]

## 2018-09-07 NOTE — TRANSFER OF CARE
"Anesthesia Transfer of Care Note    Patient: César Medina    Procedure(s) Performed: Procedure(s) (LRB):  COLONOSCOPY (N/A)    Patient location: PACU    Anesthesia Type: general    Transport from OR: Transported from OR on 2-3 L/min O2 by NC with adequate spontaneous ventilation    Post pain: adequate analgesia    Post assessment: no apparent anesthetic complications    Post vital signs: stable    Level of consciousness: awake    Nausea/Vomiting: no nausea/vomiting    Complications: none    Transfer of care protocol was followed      Last vitals:   Visit Vitals  BP (!) 141/88 (BP Location: Left arm, Patient Position: Lying)   Pulse (!) 58   Temp 36.3 °C (97.3 °F) (Oral)   Resp 12   Ht 5' 6" (1.676 m)   Wt 81.6 kg (180 lb)   SpO2 100%   BMI 29.05 kg/m²     "

## 2018-09-07 NOTE — DISCHARGE SUMMARY
[facility]  Discharge Note  Short Stay    Admit Date: 9/7/2018    Discharge Date and Time: No discharge date for patient encounter.     Attending Physician: Costa Strickland MD     Discharge Provider: Costa Strickland    Diagnoses:  Active Hospital Problems    Diagnosis  POA    *Encounter for screening colonoscopy [Z12.11]  Not Applicable      Resolved Hospital Problems   No resolved problems to display.       Discharged Condition: good    Hospital Course: Patient was admitted for an outpatient procedure and tolerated the procedure well with no complications.    Final Diagnoses: Same as principal problem.    Disposition: Home or Self Care    Follow up/Patient Instructions:    Medications:  Reconciled Home Medications:      Medication List      CONTINUE taking these medications    amitriptyline 25 MG tablet  Commonly known as:  ELAVIL  Take 1 tablet by mouth.     aspirin 81 MG EC tablet  Commonly known as:  ECOTRIN  Take 1 tablet by mouth.     atorvastatin 40 MG tablet  Commonly known as:  LIPITOR  Take 40 mg by mouth once daily.     GLUCOPHAGE  MG 24 hr tablet  Generic drug:  metFORMIN  Take 2 tablets by mouth.     insulin aspart protamine-insulin aspart 100 unit/mL (70-30) Inpn pen  Commonly known as:  NovoLOG 70/30  Inject into the skin 3 (three) times daily before meals.     insulin detemir U-100 100 unit/mL injection  Commonly known as:  LEVEMIR  Inject 25 Units into the skin 2 (two) times daily before meals.     NEURONTIN 300 MG capsule  Generic drug:  gabapentin  take 1 capsule by oral route at bedtime - will cause drowsiness- use caution     NORVASC 5 MG tablet  Generic drug:  amLODIPine  Take 1 tablet by mouth.        STOP taking these medications    sodium,potassium,mag sulfates 17.5-3.13-1.6 gram Solr  Commonly known as:  SUPREP BOWEL PREP KIT          Discharge Procedure Orders   Diet diabetic     No driving until:     Order Specific Question Answer Comments   Date: 9/8/2018       Follow-up Information     Adrianne Rice NP.    Specialty:  Family Medicine  Why:  As scheduled  Contact information:  [address] [place] [address] [address]  [place] [address] [phone]                   Discharge Procedure Orders (must include Diet, Follow-up, Activity):   Discharge Procedure Orders (must include Diet, Follow-up, Activity)   Diet diabetic     No driving until:     Order Specific Question Answer Comments   Date: 9/8/2018

## 2018-09-07 NOTE — INTERVAL H&P NOTE
The patient has been examined and the H&P has been reviewed:    I concur with the findings and no changes have occurred since H&P was written.      Lab results were reviewed from 9/7/2018.  CBC shows a very mild microcytic hypochromic anemia with a hemoglobin of 12.6 grams/deciliter, no leukocytosis or thrombocytopenia.  Electrolytes are all in the range of normal. BUN and creatinine showed no evidence of renal dysfunction.  Glucose was minimally elevated.  Liver profile showed a mild hyperbilirubinemia with a total bilirubin of 1.4 mg/dL but no other evidence of hepatocellular disease or biliary obstruction.    EKG reviewed from 9/7/2018.  Tracing showed a normal sinus rhythm with a possible old inferior infarct in nonspecific T-wave changes.    Mr. Medina   Did not remain fully NPO as instructed.  He had a small quantity of water only shortly before 6:00 a.m. this morning.  Case will be delayed until anesthesia services determines that it is safe to proceed with sedation.    Surgery risks, benefits and alternative options discussed and understood by patient/family.          Active Hospital Problems    Diagnosis  POA    Encounter for screening colonoscopy [Z12.11]  Not Applicable      Resolved Hospital Problems   No resolved problems to display.

## 2018-09-07 NOTE — ANESTHESIA PREPROCEDURE EVALUATION
09/07/2018  César Medina is a 57 y.o., male.    Pre-op Assessment    I have reviewed the Patient Summary Reports.    I have reviewed the Nursing Notes.   I have reviewed the Medications.     Review of Systems  Anesthesia Hx:  No problems with previous Anesthesia  Neg history of prior surgery. Denies Family Hx of Anesthesia complications.   Denies Personal Hx of Anesthesia complications.   Social:  Non-Smoker    Hematology/Oncology:  Hematology Normal   Oncology Normal     EENT/Dental:EENT/Dental Normal   Cardiovascular:   Hypertension, well controlled ECG has been reviewed. Patient with an abnormal EKG. He denies any cardiac symptoms   Pulmonary:  Pulmonary Normal    Renal/:  Renal/ Normal     Hepatic/GI:  Hepatic/GI Normal    Musculoskeletal:  Musculoskeletal Normal    Neurological:  Neurology Normal    Endocrine:   Diabetes, well controlled, type 2    Dermatological:  Skin Normal    Psych:  Psychiatric Normal           Physical Exam  General:  Well nourished    Airway/Jaw/Neck:  AIRWAY FINDINGS: Normal      Eyes/Ears/Nose:  EYES/EARS/NOSE FINDINGS: Normal   Dental:  DENTAL FINDINGS: Normal   Chest/Lungs:  Chest/Lungs Clear    Heart/Vascular:  Heart Findings: Normal Heart murmur: negative Vascular Findings: Normal    Abdomen:  Abdomen Findings: Normal    Musculoskeletal:  Musculoskeletal Findings: Normal   Skin:  Skin Findings: Normal         Anesthesia Plan  Type of Anesthesia, risks & benefits discussed:  Anesthesia Type:  general  Patient's Preference:   Intra-op Monitoring Plan: standard ASA monitors  Intra-op Monitoring Plan Comments:   Post Op Pain Control Plan:   Post Op Pain Control Plan Comments:   Induction:   IV  Beta Blocker:  Patient is not currently on a Beta-Blocker (No further documentation required).       Informed Consent: Patient understands risks and agrees with Anesthesia plan.   Questions answered. Anesthesia consent signed with patient.  ASA Score: 3     Day of Surgery Review of History & Physical:    H&P update referred to the provider.

## 2018-09-10 LAB — POCT GLUCOSE: 139 MG/DL (ref 70–110)

## 2018-09-24 ENCOUNTER — OFFICE VISIT (OUTPATIENT)
Dept: SURGERY | Facility: CLINIC | Age: 58
End: 2018-09-24
Payer: COMMERCIAL

## 2018-09-24 VITALS
DIASTOLIC BLOOD PRESSURE: 88 MMHG | TEMPERATURE: 97 F | HEIGHT: 66 IN | HEART RATE: 82 BPM | WEIGHT: 180 LBS | BODY MASS INDEX: 28.93 KG/M2 | OXYGEN SATURATION: 97 % | SYSTOLIC BLOOD PRESSURE: 143 MMHG

## 2018-09-24 DIAGNOSIS — Z80.0 FAMILY HISTORY OF COLON CANCER: ICD-10-CM

## 2018-09-24 DIAGNOSIS — Z12.11 COLON CANCER SCREENING: Primary | ICD-10-CM

## 2018-09-24 PROCEDURE — 99212 OFFICE O/P EST SF 10 MIN: CPT | Mod: S$GLB,,, | Performed by: SURGERY

## 2018-09-24 PROCEDURE — 3008F BODY MASS INDEX DOCD: CPT | Mod: S$GLB,,, | Performed by: SURGERY

## 2018-09-24 NOTE — PROGRESS NOTES
"Subjective:       Patient ID: César Medina is a 57 y.o. male.    Chief Complaint: Follow-up      HPI:  Mr. Medina presents today following a recent outpatient screening colonoscopy  He presents today with no complaints.  No nausea, vomiting, fevers, chills, abdominal pain, diarrhea, constipation, melena, hematochezia, hematemesis, etc.  Appetite is excellent.  Weight is stable.  Activity tolerance is normal.  Overall he feels "great".  Colonoscopy was completely normal.    Colonoscopy report from 9/7/2018 was reviewed, above findings confirmed.        Allergies & Meds:  Review of patient's allergies indicates:  No Known Allergies    Current Outpatient Medications   Medication Sig Dispense Refill    amitriptyline (ELAVIL) 25 MG tablet Take 1 tablet by mouth.      amLODIPine (NORVASC) 5 MG tablet Take 1 tablet by mouth.      aspirin (ECOTRIN) 81 MG EC tablet Take 1 tablet by mouth.      atorvastatin (LIPITOR) 40 MG tablet Take 40 mg by mouth once daily.      gabapentin (NEURONTIN) 300 MG capsule take 1 capsule by oral route at bedtime - will cause drowsiness- use caution      insulin aspart protamine-insulin aspart (NOVOLOG 70/30) 100 unit/mL (70-30) InPn pen Inject into the skin 3 (three) times daily before meals.      insulin detemir (LEVEMIR) 100 unit/mL injection Inject 25 Units into the skin 2 (two) times daily before meals.      metFORMIN (GLUCOPHAGE XR) 500 MG 24 hr tablet Take 2 tablets by mouth.       No current facility-administered medications for this visit.        PMFSHx:  Past medical history, surgical history, family history, social history reviewed and no changes noted.        Review of Systems   Constitutional: Negative for appetite change, chills, fatigue, fever and unexpected weight change.   HENT: Negative for congestion, dental problem, ear pain, mouth sores, postnasal drip, rhinorrhea, sore throat, tinnitus, trouble swallowing and voice change.    Eyes: Negative for photophobia, pain, " discharge and visual disturbance.   Respiratory: Negative for cough, chest tightness, shortness of breath and wheezing.    Cardiovascular: Negative for chest pain, palpitations and leg swelling.   Gastrointestinal: Negative for abdominal pain, blood in stool, constipation, diarrhea, nausea and vomiting.   Endocrine: Negative for cold intolerance, heat intolerance, polydipsia, polyphagia and polyuria.   Genitourinary: Negative for difficulty urinating, dysuria, flank pain, frequency, hematuria and urgency.   Musculoskeletal: Negative for arthralgias, joint swelling and myalgias.   Skin: Negative for color change and rash.   Allergic/Immunologic: Negative for immunocompromised state.   Neurological: Negative for dizziness, tremors, seizures, syncope, speech difficulty, weakness, numbness and headaches.   Hematological: Negative for adenopathy. Does not bruise/bleed easily.   Psychiatric/Behavioral: Negative for agitation, confusion, hallucinations, self-injury and suicidal ideas. The patient is not nervous/anxious.        Objective:      Physical Exam   Constitutional: He appears well-developed and well-nourished.  Non-toxic appearance. He does not appear ill. No distress.   Cardiovascular: Normal rate, regular rhythm and normal heart sounds. PMI is not displaced. Exam reveals no gallop.   No murmur heard.  Pulmonary/Chest: Effort normal and breath sounds normal. No accessory muscle usage. No tachypnea. No respiratory distress.   Abdominal: Soft. Normal appearance and bowel sounds are normal. There is no tenderness. No hernia.   Skin: Skin is warm, dry and intact. No rash noted.         Test Results:  See above    Assessment:       1. Colon cancer screening    2. Family history of colon cancer        Plan:   Colon cancer screening    Family history of colon cancer     Repeat screening colonoscopy recommended in 5 years, sooner if indications arise  Family history of colon cancer      Follow-up for any concerns or  questions as needed, resume care with PCP.

## 2024-04-02 ENCOUNTER — HOSPITAL ENCOUNTER (EMERGENCY)
Facility: HOSPITAL | Age: 64
Discharge: HOME | End: 2024-04-02
Attending: EMERGENCY MEDICINE
Payer: COMMERCIAL

## 2024-04-02 VITALS
WEIGHT: 145 LBS | OXYGEN SATURATION: 97 % | HEART RATE: 70 BPM | HEIGHT: 66 IN | TEMPERATURE: 98 F | RESPIRATION RATE: 20 BRPM | DIASTOLIC BLOOD PRESSURE: 74 MMHG | SYSTOLIC BLOOD PRESSURE: 157 MMHG | BODY MASS INDEX: 23.3 KG/M2

## 2024-04-02 DIAGNOSIS — M54.14 THORACIC RADICULOPATHY: Primary | ICD-10-CM

## 2024-04-02 DIAGNOSIS — R07.89 ATYPICAL CHEST PAIN: ICD-10-CM

## 2024-04-02 DIAGNOSIS — M54.6 ACUTE LEFT-SIDED THORACIC BACK PAIN: ICD-10-CM

## 2024-04-02 LAB
ANION GAP SERPL CALC-SCNC: 14 MMOL/L (ref 8–16)
BASOPHILS # BLD AUTO: 0.04 K/UL (ref 0–0.2)
BASOPHILS NFR BLD: 0.8 % (ref 0–1.9)
BUN SERPL-MCNC: 18 MG/DL (ref 8–23)
CALCIUM SERPL-MCNC: 10.1 MG/DL (ref 8.7–10.5)
CHLORIDE SERPL-SCNC: 107 MMOL/L (ref 95–110)
CO2 SERPL-SCNC: 19 MMOL/L (ref 23–29)
CREAT SERPL-MCNC: 1.4 MG/DL (ref 0.5–1.4)
DIFFERENTIAL METHOD BLD: ABNORMAL
EOSINOPHIL # BLD AUTO: 0.1 K/UL (ref 0–0.5)
EOSINOPHIL NFR BLD: 1.3 % (ref 0–8)
ERYTHROCYTE [DISTWIDTH] IN BLOOD BY AUTOMATED COUNT: 14.1 % (ref 11.5–14.5)
EST. GFR  (NO RACE VARIABLE): 57.2 ML/MIN/1.73 M^2
GLUCOSE SERPL-MCNC: 203 MG/DL (ref 70–110)
HCT VFR BLD AUTO: 41.7 % (ref 40–54)
HCV AB SERPL QL IA: NORMAL
HGB BLD-MCNC: 14.1 G/DL (ref 14–18)
HIV 1+2 AB+HIV1 P24 AG SERPL QL IA: NORMAL
IMM GRANULOCYTES # BLD AUTO: 0.02 K/UL (ref 0–0.04)
IMM GRANULOCYTES NFR BLD AUTO: 0.4 % (ref 0–0.5)
LYMPHOCYTES # BLD AUTO: 1.2 K/UL (ref 1–4.8)
LYMPHOCYTES NFR BLD: 22.6 % (ref 18–48)
MCH RBC QN AUTO: 26 PG (ref 27–31)
MCHC RBC AUTO-ENTMCNC: 33.8 G/DL (ref 32–36)
MCV RBC AUTO: 77 FL (ref 82–98)
MONOCYTES # BLD AUTO: 0.5 K/UL (ref 0.3–1)
MONOCYTES NFR BLD: 8.5 % (ref 4–15)
NEUTROPHILS # BLD AUTO: 3.5 K/UL (ref 1.8–7.7)
NEUTROPHILS NFR BLD: 66.4 % (ref 38–73)
NRBC BLD-RTO: 0 /100 WBC
OHS QRS DURATION: 84 MS
OHS QTC CALCULATION: 423 MS
PLATELET # BLD AUTO: 261 K/UL (ref 150–450)
PMV BLD AUTO: 9.2 FL (ref 9.2–12.9)
POCT GLUCOSE: 205 MG/DL (ref 70–110)
POTASSIUM SERPL-SCNC: 4.1 MMOL/L (ref 3.5–5.1)
RBC # BLD AUTO: 5.43 M/UL (ref 4.6–6.2)
SODIUM SERPL-SCNC: 140 MMOL/L (ref 136–145)
TROPONIN I SERPL DL<=0.01 NG/ML-MCNC: 0.01 NG/ML (ref 0–0.03)
WBC # BLD AUTO: 5.3 K/UL (ref 3.9–12.7)

## 2024-04-02 PROCEDURE — 99285 EMERGENCY DEPT VISIT HI MDM: CPT | Mod: 25

## 2024-04-02 PROCEDURE — 80048 BASIC METABOLIC PNL TOTAL CA: CPT | Performed by: EMERGENCY MEDICINE

## 2024-04-02 PROCEDURE — 74174 CTA ABD&PLVS W/CONTRAST: CPT | Mod: 26,,, | Performed by: RADIOLOGY

## 2024-04-02 PROCEDURE — 93010 ELECTROCARDIOGRAM REPORT: CPT | Mod: ,,, | Performed by: INTERNAL MEDICINE

## 2024-04-02 PROCEDURE — 25500020 PHARM REV CODE 255: Performed by: EMERGENCY MEDICINE

## 2024-04-02 PROCEDURE — 96375 TX/PRO/DX INJ NEW DRUG ADDON: CPT

## 2024-04-02 PROCEDURE — 25000003 PHARM REV CODE 250: Performed by: EMERGENCY MEDICINE

## 2024-04-02 PROCEDURE — 85025 COMPLETE CBC W/AUTO DIFF WBC: CPT | Performed by: EMERGENCY MEDICINE

## 2024-04-02 PROCEDURE — 63600175 PHARM REV CODE 636 W HCPCS: Mod: JZ,JG | Performed by: EMERGENCY MEDICINE

## 2024-04-02 PROCEDURE — 74174 CTA ABD&PLVS W/CONTRAST: CPT | Mod: TC

## 2024-04-02 PROCEDURE — 71275 CT ANGIOGRAPHY CHEST: CPT | Mod: 26,,, | Performed by: RADIOLOGY

## 2024-04-02 PROCEDURE — 84484 ASSAY OF TROPONIN QUANT: CPT | Performed by: EMERGENCY MEDICINE

## 2024-04-02 PROCEDURE — 82962 GLUCOSE BLOOD TEST: CPT

## 2024-04-02 PROCEDURE — 87389 HIV-1 AG W/HIV-1&-2 AB AG IA: CPT | Performed by: EMERGENCY MEDICINE

## 2024-04-02 PROCEDURE — 96374 THER/PROPH/DIAG INJ IV PUSH: CPT

## 2024-04-02 PROCEDURE — 86803 HEPATITIS C AB TEST: CPT | Performed by: EMERGENCY MEDICINE

## 2024-04-02 PROCEDURE — 93005 ELECTROCARDIOGRAM TRACING: CPT

## 2024-04-02 RX ORDER — ONDANSETRON HYDROCHLORIDE 2 MG/ML
4 INJECTION, SOLUTION INTRAVENOUS
Status: COMPLETED | OUTPATIENT
Start: 2024-04-02 | End: 2024-04-02

## 2024-04-02 RX ORDER — METHOCARBAMOL 500 MG/1
1000 TABLET, FILM COATED ORAL
Status: COMPLETED | OUTPATIENT
Start: 2024-04-02 | End: 2024-04-02

## 2024-04-02 RX ORDER — HYDROCODONE BITARTRATE AND ACETAMINOPHEN 5; 325 MG/1; MG/1
1 TABLET ORAL EVERY 6 HOURS PRN
Qty: 12 TABLET | Refills: 0 | Status: SHIPPED | OUTPATIENT
Start: 2024-04-02 | End: 2024-04-09

## 2024-04-02 RX ORDER — LIDOCAINE 50 MG/G
1 PATCH TOPICAL
Status: DISCONTINUED | OUTPATIENT
Start: 2024-04-02 | End: 2024-04-02 | Stop reason: HOSPADM

## 2024-04-02 RX ORDER — DICLOFENAC SODIUM 10 MG/G
2 GEL TOPICAL 4 TIMES DAILY
Qty: 150 G | Refills: 0 | Status: SHIPPED | OUTPATIENT
Start: 2024-04-02 | End: 2024-04-12

## 2024-04-02 RX ORDER — MORPHINE SULFATE 2 MG/ML
4 INJECTION, SOLUTION INTRAMUSCULAR; INTRAVENOUS
Status: COMPLETED | OUTPATIENT
Start: 2024-04-02 | End: 2024-04-02

## 2024-04-02 RX ORDER — CYCLOBENZAPRINE HCL 10 MG
10 TABLET ORAL 3 TIMES DAILY PRN
Qty: 15 TABLET | Refills: 0 | Status: SHIPPED | OUTPATIENT
Start: 2024-04-02 | End: 2024-04-07

## 2024-04-02 RX ADMIN — IOHEXOL 75 ML: 350 INJECTION, SOLUTION INTRAVENOUS at 10:04

## 2024-04-02 RX ADMIN — ONDANSETRON 4 MG: 2 INJECTION INTRAMUSCULAR; INTRAVENOUS at 09:04

## 2024-04-02 RX ADMIN — MORPHINE SULFATE 4 MG: 2 INJECTION, SOLUTION INTRAMUSCULAR; INTRAVENOUS at 09:04

## 2024-04-02 RX ADMIN — METHOCARBAMOL 1000 MG: 500 TABLET ORAL at 12:04

## 2024-04-02 RX ADMIN — LIDOCAINE 1 PATCH: 50 PATCH TOPICAL at 09:04

## 2024-04-02 NOTE — ED PROVIDER NOTES
History     Chief Complaint   Patient presents with    Back Pain     Left sided upper back pain accompanied with pain radiation down left leg onset yesterday. Denies trauma or injury.     HPI:  César Medina is a 63 y.o. right-handed male with PMH as below who presents to the [facility] emergency department for evaluation of sudden onset, sharp, left upper back back pain radiating down the left side of his chest and arm with associated left leg and arm tingling/numbness. He denies specific trauma but he is an  by trade and lifts heavy items daily.  He reports chronic uncontrolled HTN. He reports family history of heart disease.     PCP: Adrianne Rice NP    Review of patient's allergies indicates:  No Known Allergies   Past Medical History:   Diagnosis Date    Breast lump     Diabetes mellitus     Diabetes mellitus, type 2     Hypertension     Impotence of organic origin     Low testosterone level in male      Past Surgical History:   Procedure Laterality Date    COLONOSCOPY N/A 9/7/2018    Procedure: COLONOSCOPY;  Surgeon: Costa Strickland MD;  Location: [facility];  Service: General;  Laterality: N/A;    FINGER SURGERY      Deep laceration of finger       Family History   Problem Relation Age of Onset    Diabetes Mother     Hypertension Mother     Cancer Father     Diabetes Father     Diabetes Sister     Hyperlipidemia Sister     Cancer Brother     Diabetes Brother     Early death Brother     Heart disease Brother     Hyperlipidemia Brother     Diabetes Maternal Aunt     Diabetes Maternal Uncle     Diabetes Paternal Aunt     Diabetes Paternal Uncle     Diabetes Maternal Grandmother     Heart disease Maternal Grandfather     Early death Brother     Heart disease Brother      Social History     Tobacco Use    Smoking status: Never    Smokeless tobacco: Former     Types: Snuff   Substance and Sexual Activity    Alcohol use: No    Drug use: No    Sexual activity: Yes     Partners: Female       "Review of Systems     Review of Systems   Constitutional: Negative.    HENT: Negative.     Eyes: Negative.    Respiratory: Negative.     Cardiovascular: Negative.    Gastrointestinal: Negative.    Endocrine: Negative.    Genitourinary: Negative.    Musculoskeletal: Negative.    Skin: Negative.    Allergic/Immunologic: Negative.    Neurological: Negative.    Hematological: Negative.    Psychiatric/Behavioral: Negative.     All other systems reviewed and are negative.       Physical Exam     Initial Vitals [04/02/24 0818]   BP Pulse Resp Temp SpO2   (!) 141/81 86 20 98.1 °F (36.7 °C) 98 %      MAP       --          Nursing notes and vital signs reviewed.  Constitutional: Patient is in moderate acute distress.   Head: Normocephalic. Atraumatic.   Eyes:  Conjunctivae are not pale. No scleral icterus.   ENT: Mucous membranes moist.   Neck: Supple.   Cardiovascular: Regular rate. Regular rhythm. No murmurs, rubs, or gallops   Pulmonary: No respiratory distress. Clear to auscultation bilaterally   Abdominal: Non-distended.   Musculoskeletal: Moves all extremities. Right small finger shortening, chronic. Left parathoracic back tenderness partially but not fully reproduces his chief complaint. 4/5 left knee extension. Otherwise 5/5 strength throughout.   Skin: Warm and dry.   Neurological:  Alert, awake, and appropriate. Normal speech. No acute lateralizing neurologic deficits appreciated. No sensory deficit x4 extremities.   Psychiatric: Normal affect.       ED Course   Procedures  Vitals:    04/02/24 0818 04/02/24 0945 04/02/24 1030   BP: (!) 141/81  (!) 161/70   Pulse: 86  72   Resp: 20 20 20   Temp: 98.1 °F (36.7 °C)     TempSrc: Oral     SpO2: 98%  96%   Weight: 65.8 kg (145 lb)     Height: 5' 6" (1.676 m)       Lab Results Interpreted as Abnormal:  Labs Reviewed   CBC W/ AUTO DIFFERENTIAL - Abnormal; Notable for the following components:       Result Value    MCV 77 (*)     MCH 26.0 (*)     All other components within " normal limits   BASIC METABOLIC PANEL - Abnormal; Notable for the following components:    CO2 19 (*)     Glucose 203 (*)     eGFR 57.2 (*)     All other components within normal limits   POCT GLUCOSE - Abnormal; Notable for the following components:    POCT Glucose 205 (*)     All other components within normal limits   TROPONIN I   HIV 1 / 2 ANTIBODY   HEPATITIS C ANTIBODY   POCT GLUCOSE MONITORING CONTINUOUS      All Lab Results:  Results for orders placed or performed during the hospital encounter of 04/02/24   CBC auto differential   Result Value Ref Range    WBC 5.30 3.90 - 12.70 K/uL    RBC 5.43 4.60 - 6.20 M/uL    Hemoglobin 14.1 14.0 - 18.0 g/dL    Hematocrit 41.7 40.0 - 54.0 %    MCV 77 (L) 82 - 98 fL    MCH 26.0 (L) 27.0 - 31.0 pg    MCHC 33.8 32.0 - 36.0 g/dL    RDW 14.1 11.5 - 14.5 %    Platelets 261 150 - 450 K/uL    MPV 9.2 9.2 - 12.9 fL    Immature Granulocytes 0.4 0.0 - 0.5 %    Gran # (ANC) 3.5 1.8 - 7.7 K/uL    Immature Grans (Abs) 0.02 0.00 - 0.04 K/uL    Lymph # 1.2 1.0 - 4.8 K/uL    Mono # 0.5 0.3 - 1.0 K/uL    Eos # 0.1 0.0 - 0.5 K/uL    Baso # 0.04 0.00 - 0.20 K/uL    nRBC 0 0 /100 WBC    Gran % 66.4 38.0 - 73.0 %    Lymph % 22.6 18.0 - 48.0 %    Mono % 8.5 4.0 - 15.0 %    Eosinophil % 1.3 0.0 - 8.0 %    Basophil % 0.8 0.0 - 1.9 %    Differential Method Automated    Basic metabolic panel   Result Value Ref Range    Sodium 140 136 - 145 mmol/L    Potassium 4.1 3.5 - 5.1 mmol/L    Chloride 107 95 - 110 mmol/L    CO2 19 (L) 23 - 29 mmol/L    Glucose 203 (H) 70 - 110 mg/dL    BUN 18 8 - 23 mg/dL    Creatinine 1.4 0.5 - 1.4 mg/dL    Calcium 10.1 8.7 - 10.5 mg/dL    Anion Gap 14 8 - 16 mmol/L    eGFR 57.2 (A) >60 mL/min/1.73 m^2   Troponin I   Result Value Ref Range    Troponin I 0.008 0.000 - 0.026 ng/mL   POCT glucose   Result Value Ref Range    POCT Glucose 205 (H) 70 - 110 mg/dL     Imaging Results              CTA Chest Abdomen Pelvis (Final result)  Result time 04/02/24 11:59:10      Final  result by Jacob Mackey MD (04/02/24 11:59:10)                   Impression:      1. No CT evidence to suggest an aortic dissection.  2. Scattered ground-glass opacities of the left lower lobe may represent atelectasis versus developing pulmonary infiltrate.  Correlate clinically with possible fever and/or elevated white count.  3. Mild focal atrophy of the left kidney with a left parapelvic cyst.  4. Mild prostatic hypertrophy.  5. Chronic bone island of the left iliac wing.      Electronically signed by: Jacob Mackey  Date:    04/02/2024  Time:    11:59               Narrative:    EXAMINATION:  CTA CHEST ABDOMEN PELVIS    CLINICAL HISTORY:  Aortic dissection suspected;    TECHNIQUE:  Multiple contiguous axial images were obtained through the chest abdomen and pelvis following the intravenous administration of 75 cc of Omnipaque 350.  Coronal and sagittal re-formatted images reconstructed.  3D reconstructed images also performed.    COMPARISON:  Plain films 01/08/2011.    FINDINGS:  The thoracic aorta opacifies normally and is normal in course and caliber.  Abdominal aorta opacifies normally and is normal in course and caliber.  There is a normal distal aortic bifurcation.  No CT evidence to suggest an aortic dissection.  Bilateral common iliac and external iliac arteries are normal in course and caliber.  Foci of mild stenosis involving the bilateral common femoral arteries.  Mild stenosis involving the proximal bilateral SFA.    Celiac artery, SMA and EMMA opacify normally and are normal in course and caliber.  Right renal artery is normal in course and caliber.  Left renal artery is normal in course and caliber.  There are 2 small left accessory renal arteries there are normal in course and caliber.    Scattered ground-glass opacities are present within the superior segment of the left lower lobe.  Right lung is clear.  Liver and spleen are normal in size and attenuation.  Gallbladder, pancreas and  adrenal glands are unremarkable.  Right kidney is normal in size and enhances homogeneously.  Mild focal atrophy of the left kidney may be secondary to prior surgery or infection.  There is a left parapelvic cyst.  No steve changes of hydronephrosis.    Air and stool throughout the colon and rectum.  No mesenteric inflammatory change.  The appendix is normal in caliber.    Bladder is prominently distended extending into the lower abdomen.  Prostate is mildly enlarged.  Seminal vesicles and rectum unremarkable.    No significant mesenteric or retroperitoneal lymphadenopathy.  Small fat containing umbilical hernia.    Bone window evaluation demonstrates a chronic poorly defined sclerotic lesion of the left iliac wing measuring 16 mm.                                   The EKG was ordered, reviewed, and independently interpreted by the ED Physician:  Rhythm: normal sinus  Rate: 71 bpm  No ST-T changes concerning for acute ischemia  Normal axis   Normal intervals     The emergency physician reviewed the vital signs and test results, which are outlined above.     ED Discussion        Tingling/numbness symptoms resolved.     Patient's evaluation in the ED does not suggest any emergent or life-threatening medical conditions requiring immediate intervention beyond what was provided in the ED, and I believe patient is safe for discharge. Regardless, an unremarkable evaluation in the ED does not preclude the development or presence of a serious or life-threatening condition. As such, patient was given return instructions for any change or worsening of symptoms.       ED Medication(s) Administered:  Medications   LIDOcaine 5 % patch 1 patch (1 patch Transdermal Patch Applied 4/2/24 0957)   methocarbamoL tablet 1,000 mg (has no administration in time range)   morphine injection 4 mg (4 mg Intravenous Given 4/2/24 0945)   ondansetron injection 4 mg (4 mg Intravenous Given 4/2/24 0945)   iohexoL (OMNIPAQUE 350) injection 75 mL (75  mLs Intravenous Given 4/2/24 1044)       Prescription Management: I performed a review of the patient's current Rx medication list as input by nursing staff.    Patient's Medications   New Prescriptions    CYCLOBENZAPRINE (FLEXERIL) 10 MG TABLET    Take 1 tablet (10 mg total) by mouth 3 (three) times daily as needed for Muscle spasms.    DICLOFENAC SODIUM (VOLTAREN) 1 % GEL    Apply 2 g topically 4 (four) times daily. for 10 days    HYDROCODONE-ACETAMINOPHEN (NORCO) 5-325 MG PER TABLET    Take 1 tablet by mouth every 6 (six) hours as needed (breakthrough pain not relieved by Flexeril and diclofenac gel).   Previous Medications    ASPIRIN (ECOTRIN) 81 MG EC TABLET    Take 1 tablet by mouth.    ATORVASTATIN (LIPITOR) 40 MG TABLET    Take 40 mg by mouth once daily.    GABAPENTIN (NEURONTIN) 300 MG CAPSULE    take 1 capsule by oral route at bedtime - will cause drowsiness- use caution    INSULIN ASPART PROTAMINE-INSULIN ASPART (NOVOLOG 70/30) 100 UNIT/ML (70-30) INPN PEN    Inject into the skin 3 (three) times daily before meals.    METFORMIN (GLUCOPHAGE XR) 500 MG 24 HR TABLET    Take 2 tablets by mouth once daily.   Modified Medications    No medications on file   Discontinued Medications    AMITRIPTYLINE (ELAVIL) 25 MG TABLET    Take 1 tablet by mouth.    AMLODIPINE (NORVASC) 5 MG TABLET    Take 1 tablet by mouth.    INSULIN DETEMIR (LEVEMIR) 100 UNIT/ML INJECTION    Inject 25 Units into the skin 2 (two) times daily before meals.         Follow-up Information       Adrianne Rice NP. Schedule an appointment as soon as possible for a visit in 3 days.    Specialty: Family Medicine  Contact information:  [address] [place] [address] [address]  [place] [address] [phone]               [facility] Emergency Dept.    Specialty: Emergency Medicine  Why: As needed, If symptoms worsen  Contact information:  [address] [facility]  [facility] [address] [phone]                          Clinical Impression        ICD-10-CM ICD-9-CM   1. Thoracic radiculopathy  M54.14 724.4   2. Atypical chest pain  R07.89 786.59   3. Acute left-sided thoracic back pain  M54.6 724.1      ED Disposition Condition    Discharge Stable             Dk Rosado MD  04/02/24 8084

## (undated) DEVICE — CANISTER SUCTION 3000CC

## (undated) DEVICE — KIT ENDO CARRY ON PROCEDURE

## (undated) DEVICE — SOL IRRI STRL WATER 1000ML

## (undated) DEVICE — GLOVE PROTEXIS PI SYN SURG 6.5